# Patient Record
Sex: MALE | Race: WHITE | NOT HISPANIC OR LATINO | Employment: UNEMPLOYED | URBAN - NONMETROPOLITAN AREA
[De-identification: names, ages, dates, MRNs, and addresses within clinical notes are randomized per-mention and may not be internally consistent; named-entity substitution may affect disease eponyms.]

---

## 2017-03-21 ENCOUNTER — OFFICE VISIT (OUTPATIENT)
Dept: INTERNAL MEDICINE | Facility: CLINIC | Age: 62
End: 2017-03-21

## 2017-03-21 VITALS
SYSTOLIC BLOOD PRESSURE: 122 MMHG | HEART RATE: 78 BPM | DIASTOLIC BLOOD PRESSURE: 88 MMHG | BODY MASS INDEX: 26.5 KG/M2 | WEIGHT: 155.2 LBS | TEMPERATURE: 98.2 F | RESPIRATION RATE: 16 BRPM | HEIGHT: 64 IN | OXYGEN SATURATION: 95 %

## 2017-03-21 DIAGNOSIS — R59.0 REACTIVE CERVICAL LYMPHADENOPATHY: Primary | ICD-10-CM

## 2017-03-21 DIAGNOSIS — B96.89 ACUTE BACTERIAL SINUSITIS: ICD-10-CM

## 2017-03-21 DIAGNOSIS — J01.90 ACUTE BACTERIAL SINUSITIS: ICD-10-CM

## 2017-03-21 PROCEDURE — 99213 OFFICE O/P EST LOW 20 MIN: CPT | Performed by: NURSE PRACTITIONER

## 2017-03-21 RX ORDER — METHYLPREDNISOLONE 4 MG/1
TABLET ORAL
Qty: 21 TABLET | Refills: 0 | Status: SHIPPED | OUTPATIENT
Start: 2017-03-21

## 2017-03-21 RX ORDER — AZITHROMYCIN 250 MG/1
TABLET, FILM COATED ORAL
Qty: 6 TABLET | Refills: 0 | Status: SHIPPED | OUTPATIENT
Start: 2017-03-21

## 2017-03-22 NOTE — PATIENT INSTRUCTIONS
Lymphadenopathy  Lymphadenopathy refers to swollen or enlarged lymph glands, also called lymph nodes. Lymph glands are part of your body's defense (immune) system, which protects the body from infections, germs, and diseases. Lymph glands are found in many locations in your body, including the neck, underarm, and groin.   Many things can cause lymph glands to become enlarged. When your immune system responds to germs, such as viruses or bacteria, infection-fighting cells and fluid build up. This causes the glands to grow in size. Usually, this is not something to worry about. The swelling and any soreness often go away without treatment. However, swollen lymph glands can also be caused by a number of diseases. Your health care provider may do various tests to help determine the cause. If the cause of your swollen lymph glands cannot be found, it is important to monitor your condition to make sure the swelling goes away.  HOME CARE INSTRUCTIONS  Watch your condition for any changes. The following actions may help to lessen any discomfort you are feeling:  · Get plenty of rest.  · Take medicines only as directed by your health care provider. Your health care provider may recommend over-the-counter medicines for pain.  · Apply moist heat compresses to the site of swollen lymph nodes as directed by your health care provider. This can help reduce any pain.  · Check your lymph nodes daily for any changes.  · Keep all follow-up visits as directed by your health care provider. This is important.  SEEK MEDICAL CARE IF:  · Your lymph nodes are still swollen after 2 weeks.  · Your swelling increases or spreads to other areas.  · Your lymph nodes are hard, seem fixed to the skin, or are growing rapidly.  · Your skin over the lymph nodes is red and inflamed.  · You have a fever.  · You have chills.  · You have fatigue.  · You develop a sore throat.  · You have abdominal pain.  · You have weight loss.  · You have night  sweats.  SEEK IMMEDIATE MEDICAL CARE IF:  · You notice fluid leaking from the area of the enlarged lymph node.  · You have severe pain in any area of your body.  · You have chest pain.  · You have shortness of breath.     This information is not intended to replace advice given to you by your health care provider. Make sure you discuss any questions you have with your health care provider.     Document Released: 09/26/2009 Document Revised: 01/08/2016 Document Reviewed: 07/23/2015  [id number] Patient Education ©2016 [employer]

## 2017-03-22 NOTE — PROGRESS NOTES
Subjective   Lester Forde is a 61 y.o. male.     Cough   This is a new problem. The current episode started in the past 7 days. The problem has been rapidly worsening. The problem occurs every few minutes. The cough is non-productive. Associated symptoms include ear pain, a fever, headaches, nasal congestion, postnasal drip and rhinorrhea. Pertinent negatives include no chest pain, chills, myalgias, rash, sore throat, shortness of breath or wheezing. Nothing aggravates the symptoms. He has tried OTC cough suppressant for the symptoms. The treatment provided moderate relief. There is no history of asthma, environmental allergies or pneumonia.   Fever    This is a new problem. The current episode started in the past 7 days. The problem occurs daily. The problem has been unchanged. The maximum temperature noted was 100 to 100.9 F. The temperature was taken using an oral thermometer. Associated symptoms include coughing, ear pain and headaches. Pertinent negatives include no abdominal pain, chest pain, congestion, diarrhea, nausea, rash, sore throat, urinary pain, vomiting or wheezing. He has tried acetaminophen for the symptoms. The treatment provided moderate relief.   Risk factors: no immunosuppression and no sick contacts     patient is here with complaints of cough, low-grade temp, sinus pressure, and a enlarged lymph node for the last 4 days.  He states that he noticed the swollen lymph node behind his right ear on Friday, and then woke up with upper respiratory symptoms on Saturday.  He states he has ran a low-grade temperature up to 100.9.  He states that he is coughing, and just does not feel well.  He has been taking over-the-counter NyQuil and that seems to control his symptoms.  He has no recent sick contacts.  He denies chest pain, shortness of breath, or body aches.     The following portions of the patient's history were reviewed and updated as appropriate: allergies, current medications, past family  history, past medical history, past social history, past surgical history and problem list.    Review of Systems   Constitutional: Positive for fever. Negative for chills.   HENT: Positive for ear pain, postnasal drip, rhinorrhea and sinus pressure. Negative for congestion and sore throat.    Eyes: Negative.  Negative for visual disturbance.   Respiratory: Positive for cough. Negative for shortness of breath and wheezing.    Cardiovascular: Negative.  Negative for chest pain and palpitations.   Gastrointestinal: Negative.  Negative for abdominal pain, diarrhea, nausea and vomiting.   Endocrine: Negative.  Negative for cold intolerance, heat intolerance and polyuria.   Genitourinary: Negative.  Negative for difficulty urinating, dysuria and flank pain.   Musculoskeletal: Negative.  Negative for joint swelling and myalgias.   Skin: Negative.  Negative for rash.   Allergic/Immunologic: Negative for environmental allergies.   Neurological: Positive for headaches. Negative for dizziness and weakness.   Hematological: Positive for adenopathy.   Psychiatric/Behavioral: Negative.  Negative for dysphoric mood and suicidal ideas. The patient is not nervous/anxious.    All other systems reviewed and are negative.      Objective   Physical Exam   Constitutional: He is oriented to person, place, and time. He appears well-developed and well-nourished.   HENT:   Head: Normocephalic and atraumatic.   Right Ear: A middle ear effusion is present.   Left Ear: Tympanic membrane normal.   Nose: Rhinorrhea present. Right sinus exhibits maxillary sinus tenderness. Left sinus exhibits maxillary sinus tenderness.   Mouth/Throat: Posterior oropharyngeal erythema present.   Yellow PND   Neck: Normal range of motion. Neck supple. No JVD present. No thyromegaly present.   Cardiovascular: Normal rate and regular rhythm.  Exam reveals no gallop and no friction rub.    No murmur heard.  Pulmonary/Chest: Breath sounds normal. He has no wheezes. He  has no rales.   Abdominal: Soft. Bowel sounds are normal. He exhibits no mass. There is no tenderness. No hernia.   Musculoskeletal: He exhibits edema.   Lymphadenopathy:        Head (right side): Occipital adenopathy present.     He has no cervical adenopathy.   Large right, non-tender occipital reactive lymph node    Neurological: He is alert and oriented to person, place, and time.   Skin: Skin is warm and dry. No rash noted.   Psychiatric: He has a normal mood and affect. Judgment normal.   Nursing note and vitals reviewed.      Assessment/Plan   Problems Addressed this Visit     None      Visit Diagnoses     Reactive cervical lymphadenopathy    -  Primary-patient has a reactive posterior right occipital lymph node, as well as an acute sinusitis, we will start on CPAP, and Medrol Dosepak, and he may continue his over-the-counter cough and cold medication.  I did offer him promethazine DM, and he declined stating that his NyQuil was working for him.  Discussed germ precautions, and that if the lymph node remained swollen, or became tender after he completed his medications that he should return to clinic for further evaluation.      Relevant Medications    azithromycin (ZITHROMAX) 250 MG tablet    MethylPREDNISolone (MEDROL, NURYS,) 4 MG tablet    Acute bacterial sinusitis   -same as above     Relevant Medications    azithromycin (ZITHROMAX) 250 MG tablet    MethylPREDNISolone (MEDROL, NURYS,) 4 MG tablet          Follow-up next scheduled appointment and when necessary